# Patient Record
Sex: MALE | Race: BLACK OR AFRICAN AMERICAN | URBAN - METROPOLITAN AREA
[De-identification: names, ages, dates, MRNs, and addresses within clinical notes are randomized per-mention and may not be internally consistent; named-entity substitution may affect disease eponyms.]

---

## 2018-05-24 ENCOUNTER — APPOINTMENT (RX ONLY)
Dept: URBAN - METROPOLITAN AREA CLINIC 169 | Facility: CLINIC | Age: 48
Setting detail: DERMATOLOGY
End: 2018-05-24

## 2018-05-24 DIAGNOSIS — L73.1 PSEUDOFOLLICULITIS BARBAE: ICD-10-CM

## 2018-05-24 DIAGNOSIS — L85.3 XEROSIS CUTIS: ICD-10-CM

## 2018-05-24 PROCEDURE — ? COUNSELING

## 2018-05-24 PROCEDURE — ? PATIENT SPECIFIC COUNSELING

## 2018-05-24 PROCEDURE — 99203 OFFICE O/P NEW LOW 30 MIN: CPT

## 2018-05-24 PROCEDURE — ? PRESCRIPTION

## 2018-05-24 PROCEDURE — ? TREATMENT REGIMEN

## 2018-05-24 PROCEDURE — ? EDUCATIONAL RESOURCES PROVIDED

## 2018-05-24 RX ORDER — BETAMETHASONE VALERATE 1 MG/ML
1 LOTION CUTANEOUS BID
Qty: 1 | Refills: 5 | Status: ERX | COMMUNITY
Start: 2018-05-24

## 2018-05-24 RX ADMIN — BETAMETHASONE VALERATE 1: 1 LOTION CUTANEOUS at 21:02

## 2018-05-24 ASSESSMENT — LOCATION SIMPLE DESCRIPTION DERM
LOCATION SIMPLE: POSTERIOR SCALP
LOCATION SIMPLE: UPPER BACK
LOCATION SIMPLE: RIGHT CHEEK
LOCATION SIMPLE: LEFT CHEEK

## 2018-05-24 ASSESSMENT — LOCATION DETAILED DESCRIPTION DERM
LOCATION DETAILED: INFERIOR THORACIC SPINE
LOCATION DETAILED: RIGHT LATERAL BUCCAL CHEEK
LOCATION DETAILED: LEFT LATERAL MANDIBULAR CHEEK
LOCATION DETAILED: RIGHT INFERIOR OCCIPITAL SCALP

## 2018-05-24 ASSESSMENT — LOCATION ZONE DERM
LOCATION ZONE: TRUNK
LOCATION ZONE: SCALP
LOCATION ZONE: FACE

## 2018-05-24 NOTE — PROCEDURE: TREATMENT REGIMEN
Detail Level: Zone
Otc Regimen: Neutrogena clear pore cleanser
Samples Given: CeraVe itch relief moisturizing lotion

## 2018-05-24 NOTE — PROCEDURE: PATIENT SPECIFIC COUNSELING
Patient reports that he has itching and blistery bumpy rash over his scalp, posterior neck, and beard area that flared up about 2-3 weeks ago.  He went to the ER and got a shot of steroid which has helped calm down the rash on the scalp.  The itching also has diminished but he still itches over his entire body.  He's been dealing with itchy oozy bumps over the neck area for several years.  He has seen a dermatologist has about 2-3 years ago in [place], [place] and was prescribed topical and oral medications.  He cannot recall the name of the medication.\\n\\nExamination shows erythematous hyperpigmented follicular papules over the hair bearing areas of the cheeks, anterior and posterior neck consistent with pseudofolliculitis barbae.   I recommend treatment with betamethasone  valerate lotion to apply daily especially after shaving.  I discussed that this condition can be a chronic recurrent problem which can be exacerbated by shaving.  I discussed with the patient that laser hair removal may be curative.  I do not appreciate any rash elsewhere in his body.
Detail Level: Detailed

## 2018-05-24 NOTE — HPI: ITCHING
How Did Your Itching Occur?: sudden in onset (over a period of weeks to a few months)
Additional History: Patient has been using hydrocortisone cream but it has not helped.